# Patient Record
(demographics unavailable — no encounter records)

---

## 2024-10-31 NOTE — HISTORY OF PRESENT ILLNESS
[Patient reported PAP Smear was normal] : Patient reported PAP Smear was normal [Y] : Positive pregnancy history [TextBox_4] : GYNHX No history of fibroids, cysts, or STDs [Papeardate] :  [LMPDate] : 9- [PGHxTotal] : 2 [facility] [FreeTextEntry1] : cs

## 2024-10-31 NOTE — PHYSICAL EXAM
[Appropriately responsive] : appropriately responsive [Alert] : alert [No Acute Distress] : no acute distress [No Lymphadenopathy] : no lymphadenopathy [Soft] : soft [Non-tender] : non-tender [Non-distended] : non-distended [No HSM] : No HSM [No Lesions] : no lesions [No Mass] : no mass [Oriented x3] : oriented x3 [Examination Of The Breasts] : a normal appearance [No Discharge] : no discharge [No Masses] : no breast masses were palpable [Labia Majora] : normal [Labia Minora] : normal [Normal] : normal [Uterine Adnexae] : normal [FreeTextEntry5] : Clear vaginal discharge consistent with ovulation

## 2024-10-31 NOTE — HISTORY OF PRESENT ILLNESS
[Patient reported PAP Smear was normal] : Patient reported PAP Smear was normal [Y] : Positive pregnancy history [TextBox_4] : GYNHX No history of fibroids, cysts, or STDs [Papeardate] :  [LMPDate] : 9- [PGHxTotal] : 2 [[facility] [FreeTextEntry1] : cs

## 2024-10-31 NOTE — HISTORY OF PRESENT ILLNESS
[Patient reported PAP Smear was normal] : Patient reported PAP Smear was normal [Y] : Positive pregnancy history [TextBox_4] : GYNHX No history of fibroids, cysts, or STDs [Papeardate] :  [LMPDate] : 9- [PGHxTotal] : 2 [[facility] [FreeTextEntry1] : cs

## 2024-10-31 NOTE — HISTORY OF PRESENT ILLNESS
[Patient reported PAP Smear was normal] : Patient reported PAP Smear was normal [Y] : Positive pregnancy history [TextBox_4] : GYNHX No history of fibroids, cysts, or STDs [Papeardate] :  [LMPDate] : 9- [PGHxTotal] : 2 [[facility] [FreeTextEntry1] : cs

## 2024-10-31 NOTE — DISCUSSION/SUMMARY
[FreeTextEntry1] : 40-year-old para P1 annual, amenorrhea,  vaginal odor UCG negative Pap HPV Vaginal culture Breast sonogram Pelvic sonogram- simple ovarian cyst  Mammogram records

## 2025-02-03 NOTE — DISCUSSION/SUMMARY
[FreeTextEntry1] : Noted drainage of sebaceous cyst Advised warm soaks Loosefitting clothing Follow-up as needed

## 2025-02-03 NOTE — HISTORY OF PRESENT ILLNESS
[FreeTextEntry1] : Patient is 41 years old para 1-0-1-1 last menstrual period January 16, 2025 Patient states that she noted a painful, swollen lump on left vulva since January 30, 2025 Patient states that this morning she noted spontaneous drainage

## 2025-02-03 NOTE — PHYSICAL EXAM
[Chaperone Present] : A chaperone was present in the examining room during all aspects of the physical examination [FreeTextEntry2] : Elvia [Labia Majora] : normal [Labia Minora] : normal [Normal] : normal [Uterine Adnexae] : normal

## 2025-03-11 NOTE — PHYSICAL EXAM
[Chaperone Declined] : Patient declined chaperone [Labia Majora] : normal [Labia Minora] : normal [Discharge] : a  ~M vaginal discharge was present [Normal] : normal [FreeTextEntry4] : Minimal vaginal discharge

## 2025-03-11 NOTE — DISCUSSION/SUMMARY
[FreeTextEntry1] : 41-year-old P1 vaginal discharge, test of cure Ureaplasma Ureaplasma/mycoplasma Vaginal culture Probiotics advised

## 2025-03-11 NOTE — HISTORY OF PRESENT ILLNESS
[TextBox_4] : GYNHX No history of fibroids, cysts, or STDs  [PGHxTotal] : 2 [facility] [FreeTextEntry1] :

## 2025-03-11 NOTE — HISTORY OF PRESENT ILLNESS
[TextBox_4] : GYNHX No history of fibroids, cysts, or STDs  [PGHxTotal] : 2 [facility] [FreeTextEntry1] :

## 2025-04-09 NOTE — HISTORY OF PRESENT ILLNESS
[FreeTextEntry1] : Aurora is a 42 y/o F who is here for birads 3 abnormality.  Pt denies feeling any palpable mass by herself. Pt c/o of shotting pain in the breast. Denies nipple discharge, and skin changes.  FHx, paternal aunt has breast ca SHx, non-smoker, EtOH occasionally   Her imaging is as follows: 03/11/2025 - B/L US--> [id number] Right breast: -At the 12N 4 there is a stable biopsy benign mass measuring 0.6 x 0.6 x 0.4 cm. -At the 3N 1 there is a circumscribed hypoechoic mass measuring 0.7 x 0.7 x 0.2 cm, probably benign. Left breast: -No suspicious solid or cystic masses subcentimeter benign cyst. No axillary adenopathy. Recommendation: Follow-up breast ultrasound in 6 months. Please note the patient will be due for her annual mammogram in June 2025.

## 2025-04-09 NOTE — ADDENDUM
[FreeTextEntry1] :  Documented by Jelani Lee acting as a scribe for Dr. GONZALES [facility] on 04/09/2025.

## 2025-04-09 NOTE — ASSESSMENT
[FreeTextEntry1] : Aurora is a 42 y/o F who is here for birads 3 abnormality.  On physical exam, there are no discrete masses in either breast or axilla. There is no nipple discharge or inversion bilaterally. There are no skin changes bilaterally. She has a tiny papilloma on the right nipple. The imaging looks normal. The lesions are all benign. The discomfort pt has is due to hormonal changes.  On the imaging they asked for a 6 month follow up with breast ultrasound. In the meantime, if anything changes, pt will let us know and will investigate further.  total time on encounter: 35 mins PLAN: -B/L Dx Mammo and US (JUNE) -F/u after imaging with the PA

## 2025-04-09 NOTE — END OF VISIT
[FreeTextEntry3] :  All medical record entries made by the ashley were at my, CYN Krishna, direction and personally dictated by me on 04/09/2025. I have reviewed the chart and agreed that the record accurately reflects my personal performance of the history, physical examination, assessment and plan. I have also personally directed, reviewed and agreed with the chart.

## 2025-04-09 NOTE — PAST MEDICAL HISTORY
[Menarche Age ____] : age at menarche was [unfilled] [Total Preg ___] : G[unfilled] [Live Births ___] : P[unfilled]  [Age At Live Birth ___] : Age at live birth: [unfilled] [FreeTextEntry7] : Less than 5 years  [FreeTextEntry8] : YES

## 2025-04-09 NOTE — DATA REVIEWED
[FreeTextEntry1] : ACC: [id number]     EXAM:  MG US BREAST COMPLETE BI   ORDERED BY: KIRAALLISON RIVERARUDY GRISSOM  PROCEDURE DATE:  03/11/2025   INTERPRETATION:  Clinical history: Dense breast screening  The patient reports her last clinical breast examination was performed within the past year.  Family history: Paternal aunt  Comparisons: Priors dating back to 28.  Findings:  Ultrasound:  Bilateral whole breast ultrasound was performed.  Right breast: At the 12:00 position 4 cm from the nipple, there is a stable biopsy benign mass measuring 0.6 x 0.6 x 0.4 cm.  At the 3:00 position 1 cm from the nipple, there is a circumscribed hypoechoic mass measuring 0.7 x 0.7 x 0.2 cm, probably benign.  No additional solid or cystic masses. No axillary adenopathy.  Left breast: No suspicious solid or cystic masses subcentimeter benign cyst. No axillary adenopathy.  Impression: No sonographic evidence of malignancy of the left breast. Probably benign right breast 3:00 position mass for which short-term sonographic follow-up in 6 months is recommended.  Recommendation: Follow-up breast ultrasound in 6 months. Please note the patient will be due for her annual mammogram in June 2025.  BI-RADS category 3: Probably Benign  --- End of Report ---

## 2025-04-21 NOTE — HISTORY OF PRESENT ILLNESS
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  The patient denies chest pain, shortness of breath and syncope. No leg swelling, orthopnea and PND. She has occasional palpitations and sometimes, when she moves her head up from touching her toes, she feels dizzy.   She was used to getting "all testing done every year" in [place] and she would like the same.   OBGYN hx: patient had  in , no complications.

## 2025-04-21 NOTE — ASSESSMENT
[FreeTextEntry1] : 41 year old woman with WPW syndrome s/p ablation in [place] who presents for a Preventive Cardiology visit.  1. WPW: I referred her to Dr. Melendrez, whom she saw. An EPS was recommended, which she declined. A loop recorder was recommended, which she agreed to as per Dr. Melendrez's note but never got done. She is asking for further guidance regarding loop recorder. I recommended she go back to Dr. Melendrez.   2. Primary prevention of heart disease: ASCVD risk is low; no significant family history of heart disease.  - Check CAC score for further risk stratification  The secondary prevention of heart disease was discussed in detail with the patient, including adhering to a heart healthy, plant based, or Mediterranean diet, and the importance of 30 minutes of moderate intensity activity for 30 minutes, 5 times a week. All the patient's questions were answered.  RTC PRN.

## 2025-04-21 NOTE — CARDIOLOGY SUMMARY
[de-identified] : 4/21/25: nsr, delta wave 2/22/24: nsr, delta wave [de-identified] : 3/4/24: 1% PVCs [de-identified] : 3/15/24: 1. Left ventricular cavity is normal in size. Left ventricular systolic function is normal with an ejection fraction of 64 % by Shah's method of disks. 2. Normal right ventricular cavity size and normal systolic function. 3. No significant valvular disease. 4. No prior echocardiogram is available for comparison. [de-identified] : 1/3/23: Carotid duplex is normal.

## 2025-06-06 NOTE — HISTORY OF PRESENT ILLNESS
[FreeTextEntry1] : Around April 2025, pt developed pain in her back and ankles. She tried doing exercises with improvement of these symptoms. However, approx 2 weeks later pt began to experience severe fatigue, which has since improved but she continues to experience intermittent symptoms, including episodes of cold sensation radiating in her arms and legs, and a sharp pain in her L hand approx 1 week ago which resolved on its own. + 1 sore on inner lip which has improved. Pt denies rashes, swelling, eye problems, diarrhea. She has a small child in .   Physical exam: GEN: pleasant, AAO woman sitting on exam table in NAD HEAD: no alopecia SKIN: no rashes MOUTH: Moist mucous membranes, no oral ulcers, normal oral aperture ENT: No LAD PULM: Clear to auscultation b/l CV: Regular rate and rhythm, no murmurs MSK Shoulders: Full ROM b/l Elbows: Full ROM b/l, no effusions Wrists; Full ROM b/l, no effusions Hands: no synovitis Hips: Full ROM b/l Knees: no effusions, full ROM b/l Ankles: no effusions, full ROM b/l Feet: no effusions, no TTP EXT: normal nailfold capillaries

## 2025-06-06 NOTE — ASSESSMENT
[FreeTextEntry1] : Episodes of pain, cold sensation, fatigue: High suspicion that pt's symptoms are related to an underlying viral infection, the symptoms of which can sometimes take 6+ weeks to resolve. Pt is concerned about MS given her cold sensation symptoms and sharp pains. - Referred for MRI c-spine, t-spine, l-spine, brain to rule out MS - f/u repeat ESR and CRP in 1 month along with additional tests for an underlying systemic process. Pt had labs in 5/2025 with ESR 30 (was 30 in 11/2024, unclear what symptoms pt had at that time), CRP 15.1 (was 9.1 in 11/2024), normal CBC and CMP, normal CK, B12, TSH, free T4, aldolase, negative CCP, RF, RO, La, HCV, parvovirus IgM  f/u prn, will call pt with lab and MRI results

## 2025-06-06 NOTE — REASON FOR VISIT
[Initial Evaluation] : an initial evaluation [FreeTextEntry1] : pain, fatigue, cold sensation x 1-1.5 months

## 2025-06-19 NOTE — BEGINNING OF VISIT
[0] : 2) Feeling down, depressed, or hopeless: Not at all (0) [PHQ-2 Negative] : PHQ-2 Negative [[id number]] : 0 [Former] : Former [Reviewed, no changes] : Reviewed, no changes [de-identified] : socially

## 2025-06-19 NOTE — BEGINNING OF VISIT
[0] : 2) Feeling down, depressed, or hopeless: Not at all (0) [PHQ-2 Negative] : PHQ-2 Negative [[id number]] : 0 [Former] : Former [Reviewed, no changes] : Reviewed, no changes [de-identified] : socially

## 2025-06-20 NOTE — ASSESSMENT
[FreeTextEntry1] : 41 year old white female with non specific complaints of fatigue , pain , numbness ? MS ruled out , negative rheumatologic work up  Symptoms improving ? patient admits to severe anxiety , cancer phobia. only positive finding is small M spike ( igA ) with normal light chain ratio and CBC , elevated inflammatory markers mainly CRP more likely viral infection , MGUS   Plan : repeat myeloma panel , Ig levels .inflammatory markers            tried to re-assure patient to the best of my ability , low suspicious of hematologic malignancy at this point .           will consider additional work up if symptoms persist

## 2025-06-20 NOTE — HISTORY OF PRESENT ILLNESS
[de-identified] : 41 year old white female with no significant PMH , started one month ago with fatigue , generalized muscle pain , hot and cold sensation ,she had extensive work up by rheumatology which showed negative rheumatologic and infection serology , MRI head and spine to rule out MS . ONly positive findings are ESR : 30 and elevated CRP in addition SPEP showed M spike : 0.5 composed  of two igA clones ( kappa and lambda )  , normal free light chain ratio,.  urine immunofixation is negative .  She denies fever , night sweats ,bone pain  . She lost few lbs she attributes to extreme anxiety ,she admits being terrified of having an underlying malignancy .

## 2025-06-20 NOTE — HISTORY OF PRESENT ILLNESS
[de-identified] : 41 year old white female with no significant PMH , started one month ago with fatigue , generalized muscle pain , hot and cold sensation ,she had extensive work up by rheumatology which showed negative rheumatologic and infection serology , MRI head and spine to rule out MS . ONly positive findings are ESR : 30 and elevated CRP in addition SPEP showed M spike : 0.5 composed  of two igA clones ( kappa and lambda )  , normal free light chain ratio,.  urine immunofixation is negative .  She denies fever , night sweats ,bone pain  . She lost few lbs she attributes to extreme anxiety ,she admits being terrified of having an underlying malignancy .  19-Mar-2024 00:44